# Patient Record
(demographics unavailable — no encounter records)

---

## 2018-06-11 NOTE — CT
CT HEAD WITHOUT CONTRAST:

 

INDICATIONS:

Dizziness.

 

COMPARISON:

08/18/2015

 

TECHNIQUE:

Multiple axial tomograms obtained through the head without IV enhancement.

 

FINDINGS:

The ventricles have normal size and position.  No evidence of intracranial mass, hemorrhage, or infar
ct identified.

 

IMPRESSION:

No acute abnormality identified.

 

POS: [facility]

## 2018-06-12 NOTE — HP
REASON FOR ADMISSION:  Dizziness.

 

HISTORY OF PRESENT ILLNESS:  The patient gives history of being in [facility] 
around 4:00 p.m. with her .  She was shopping.  She felt sudden onset of 
dizziness.  She almost fell, but held onto a table.  The  told her that 
she last ate around 10 in the morning and they finally went home and ate.  She 
was still feeling dizzy and finally made it to the emergency room.  No 
complaints of chest pain, palpitation, PND, or orthopnea.  No complaints of any 
specific weakness in any of the extremities.  No complaints of injury to the 
head or discharge from the ear.  No tinnitus.

 

PAST MEDICAL AND SURGICAL HISTORY:  Hypertension, not on any medication; 
history of asthma, which is well controlled; depression; bipolar disorder; back 
surgery x2; gastric bypass done in 2016; breast reduction surgery; 
hysterectomy.

 

CURRENT MEDICATIONS:  Patient takes Zoloft 10 mg daily, multivitamin 1 tab daily
, calcium with Vitamin D 1 tab daily.

 

ALLERGIES:  PENICILLIN.

 

PERSONAL HISTORY:  Does not abuse alcohol or drugs.  No history of smoking.

 

FAMILY HISTORY:  Mother is living and healthy.  Biological father  of lung 
cancer with mets in his 50s.

 

CODE STATUS:  FULL.  Power of  is her .

 

PRIMARY CARE PHYSICIAN:  Dr. Betty Serna.

 

REVIEW OF SYSTEMS:  The following complete review of systems was negative, 
unless otherwise mentioned in the HPI or below:  Constitutional:  Weight loss 
or gain, ability to conduct usual activities.  Skin:  Rash, itching.  Eyes:  
Double vision, pain.  ENT/Mouth:  Nose bleeding, neck stiffness, pain, 
tenderness.  Cardiovascular:  Palpitations, dyspnea on exertion, orthopnea.  
Respiratory:  Shortness of breath, wheezing, cough, hemoptysis, fever or night 
sweats.  Gastrointestinal:  Poor appetite, abdominal pain, heartburn, nausea, 
vomiting, constipation, or diarrhea.  Genitourinary:  Urgency, frequency, 
dysuria, nocturia.  Musculoskeletal:  Pain, swelling.  Neurologic/Psychiatric:  
Anxiety, depression.  Allergy/Immunologic:  Skin rash, bleeding tendency.

 

PHYSICAL EXAMINATION:

GENERAL:  The patient is a 47-year-old female who is currently not in any acute 
distress.

VITAL SIGNS:  Blood pressure 164/78, pulse 64 per minute, respiratory rate 18 
per minute, temperature 98.4 degrees Fahrenheit, saturating 98% on room air.

NECK:  Supple.  No elevated JVD.

HEENT: Eyes:  Extraocular muscles intact.  Pupils reacting to light.  Oral 
cavity mucous membranes are moist.  No exudates or congestion.

CARDIOVASCULAR:  S1, S2 heard.  Regular rhythm.

RESPIRATORY:  Air entry 2+ bilaterally.  No rales or rhonchi.

ABDOMEN:  Soft, bowel sounds heard.  No tenderness, rigidity, or guarding.

EXTREMITIES:  No peripheral edema or calf tenderness.

VASCULAR SYSTEM:  Peripheral pulses 2+ bilateral.  No ischemic ulcerations or 
gangrene.

CENTRAL NERVOUS SYSTEM:  No gross focal deficits seen.  Patient is alert, awake
, oriented well.

PSYCHIATRIC:  The patient's mood is euthymic.  No hallucinations or delusions.

 

LABORATORY AND X-RAY FINDINGS:  EKG done shows normal sinus rhythm at 66 beats 
per minute.  There is poor R-wave progression seen.  White count of 4, H and H 
13 and 36, platelet count 258,000 with 40% neutrophils and 56% lymphocytes.  
BUN 19, creatinine 1.0.  Serum glucose was 190.  Liver enzymes within normal 
limits.  First set of cardiac enzymes are negative.  UA shows moderate 
leukocyte esterase with 11-20 wbc's, but no bacteria seen.  CT brain done shows 
no acute infarct.

 

CLINICAL IMPRESSION AND PLAN:  The patient will be under observation on the 
stroke unit for severe abrupt onset of dizziness.  The patient also has 
hypertensive urgency and is not on any medications at home.  She will be given 
a dose of Procardia-XL and will be continued on 30 mg daily along with 
lisinopril with hydrochlorothiazide once daily.  We will obtain orthostatic 
blood pressures.  MRI without contrast to rule out posterior circulation 
stroke.  We will obtain a hemoglobin A1c level in view of elevated serum 
glucose.  Lipid profile will be obtained as well.  We will continue to closely 
monitor her on the stroke unit.

 

CARLI

## 2018-06-12 NOTE — PDOC.PN
- Subjective


Encounter Start Date: 06/12/18


Encounter Start Time: 17:00





Patient seen and examined for Dizziness. Still feels dizzy. No overnight events





- Objective


Resuscitation Status: 


 











Resuscitation Status           FULL:Full Resuscitation














MAR Reviewed: Yes


Vital Signs & Weight: 


 Vital Signs (12 hours)











  Temp Pulse Resp BP BP BP Pulse Ox


 


 06/12/18 15:03  97.3 F L  62  16    117/85  96


 


 06/12/18 11:18  98.6 F  72  16   147/100 H   97


 


 06/12/18 09:43   67   156/110 H   


 


 06/12/18 08:00  98.5 F  67  18    


 


 06/12/18 07:16  98.5 F  67  16    138/95 H  100








 Weight











Weight                         184 lb 11.2 oz














I&O: 


 











 06/11/18 06/12/18 06/13/18





 06:59 06:59 06:59


 


Intake Total  1425 960


 


Balance  1425 960











Result Diagrams: 


 06/12/18 04:31





 06/12/18 04:31


Additional Labs: 


 Accuchecks











  06/12/18 06/12/18 06/12/18





  16:32 11:30 05:37


 


POC Glucose  153 H  89  82














  06/12/18





  01:49


 


POC Glucose  140 H











Radiology Reviewed by me: Yes (MRI neg)


EKG Reviewed by me: Yes (Tele SR)





Phys Exam





- Physical Examination


Constitutional: NAD


Neck: no JVD, supple


Respiratory: no wheezing, no rales, no rhonchi, clear to auscultation bilateral


Cardiovascular: RRR, no rub


no heaves/pulsations


Gastrointestinal: soft, non-tender, no distention, positive bowel sounds


Musculoskeletal: no edema, pulses present


Neurological: non-focal, normal sensation, moves all 4 limbs


Psychiatric: normal affect, A&O x 3





Dx/Plan


(1) Dizziness


Code(s): R42 - DIZZINESS AND GIDDINESS   Status: Acute   





(2) Hypertensive urgency


Code(s): I16.0 - HYPERTENSIVE URGENCY   Status: Acute   





(3) UTI (urinary tract infection)


Status: Acute   





(4) Obesity (BMI 30.0-34.9)


Code(s): E66.9 - OBESITY, UNSPECIFIED   Status: Chronic   





(5) Depression


Code(s): F32.9 - MAJOR DEPRESSIVE DISORDER, SINGLE EPISODE, UNSPECIFIED   Status

: Chronic   





- Plan


DVT proph w/SCDs


Cont Lis-HCTZ, DC Procardia XL


-: Add Amlodipine if SBP >160


-: Cont Cipro for UTI - No cultures sent


-: Patient still feels dizzy and unsteady - Does not feel comfortable with dc


-: Cont current meds as below





Review of Systems





- Review of Systems


Respiratory: negative: Cough, Dry, Shortness of Breath, Hemoptysis, SOB with 

Excertion, Pleuritic Pain, Sputum, Wheezing


Cardiovascular: light headedness.  negative: chest pain, palpitations, orthopnea

, paroxysmal nocturnal dyspnea, edema, other


Gastrointestinal: negative: Nausea, Vomiting, Abdominal Pain, Diarrhea, 

Constipation, Melena, Hematochezia, Other





- Medications/Allergies


Allergies/Adverse Reactions: 


 Allergies











Allergy/AdvReac Type Severity Reaction Status Date / Time


 


Penicillins Allergy   Verified 10/20/14 08:24


 


propofol Allergy   Verified 10/20/14 11:08











Medications: 


 Current Medications





Acetaminophen (Tylenol)  650 mg PO Q4H PRN


   PRN Reason: Headache/Fever or Pain


   Last Admin: 06/12/18 15:59 Dose:  650 mg


Amlodipine Besylate (Norvasc)  5 mg PO DAILY PRN


   PRN Reason: SBP GREATER THAN 160


Ciprofloxacin (Cipro)  500 mg PO 0600,2000 [facility]


   Last Admin: 06/12/18 04:47 Dose:  500 mg


Clonidine (Catapres)  0.1 mg PO Q4H PRN


   PRN Reason: Systolic BP > 180


Dextrose/Water (Dextrose 50%)  25 gm SLOW IVP PRN PRN


   PRN Reason: Hypoglycemia


Famotidine (Pepcid)  20 mg PO BID [facility]


   Last Admin: 06/12/18 09:43 Dose:  20 mg


Glucagon (Glucagon)  1 mg IM PRN PRN


   PRN Reason: Hypoglycemia


Guaifenesin/Dextromethorphan (Robitussin Dm)  15 ml PO Q4H PRN


   PRN Reason: Cough


Lisinopril/HCTZ (Prinizide 10-12.5)  1 tab PO DAILY [facility]


   Last Admin: 06/12/18 09:43 Dose:  1 tab


Dextrose/Water (D5w)  1,000 mls @ 0 mls/hr IV .Q0M PRN; As Directed


   PRN Reason: Hypoglycemia


Insulin Human Lispro (Humalog)  0 units SC .MILD SLIDING SCALE PRN


   PRN Reason: Mild Correctional Scale


Meclizine HCl (Antivert)  25 mg PO Q8H PRN


   PRN Reason: Dizziness


   Last Admin: 06/12/18 15:59 Dose:  25 mg


Sodium Chloride (Flush - Normal Saline)  10 ml IVF Q12HR RAJ


Sodium Chloride (Flush - Normal Saline)  10 ml IVF PRN PRN


   PRN Reason: Saline Flush


   Last Admin: 06/12/18 10:23 Dose:  10 ml

## 2018-06-12 NOTE — MRI
BRAIN MRI WITHOUT CONTRAST:

 

06/12/2018

 

HISTORY:

Dizziness.  Transient ischemic attack.

 

COMPARISON:

None.

 

TECHNIQUE:

Multiplanar, multisequence MR imaging of the brain is provided without contrast.

 

FINDINGS:

The diffusion weighted imaging demonstrates no evidence for acute infarction.  The axial gradient ech
o imaging demonstrates no evidence for intracranial hemorrhage.

 

The imaged paranasal sinuses/mastoid air cells appear grossly unremarkable.  The arterial flow voids 
at the axial level of the skull base appear unremarkable on the T2 weighted imaging.  No midline shif
t, mass effect, or ventricular enlargement.  The orbits and globes demonstrate normal symmetric signa
l intensity.

 

Regional bone marrow signal intensity appears grossly unremarkable.

 

IMPRESSION:

No evidence for acute infarction or intracranial hemorrhage.

 

POS: [facility]

## 2018-06-13 NOTE — DIS
DATE OF ADMISSION:  06/12/2018

 

DATE OF DISCHARGE:  06/13/2018

 

ADMITTING DIAGNOSIS:  Severe dizziness.

 

DISCHARGE DIAGNOSIS:  Severe dizziness.

 

SECONDARY DIAGNOSES:

1.  Hypertensive urgency.

2.  History of depression.

3.  History of bipolar disorder.

4.  History of asthma.

 

HISTORY OF PRESENT ILLNESS AND HOSPITAL COURSE:  In brief, this is a 47-year-old African-American fem
vishnu with a known history of hypertension, poorly controlled.  She was at [facility] around 4:00 p.m. wit
h her  while shopping and she felt sudden onset of dizziness and she almost fell, but held ont
o the table.  The  told her that she last ate around 10 in the morning and they finally went h
ome and ate and she was still feeling dizzy and she came to the ER and she was noted to have a marked
ly elevated blood pressure of more than 200s.  Patient was initially started on lisinopril/hydrochlor
othiazide combination, which did not bring the blood pressures down.  She was closely monitored and w
as trying to rule out any evidence of intracranial tumor, so MRI of the brain was done to rule out an
y intracranial tumors.  This was unremarkable.  Her blood pressures remained high, so patient was sta
rted on amlodipine along with lisinopril and hydrochlorothiazide combination, which did help to bring
 the blood pressures down.  The patient's dizziness did disappear.  I discussed with the patient abou
t healthy habits of exercising and good sleep.  She did mention that she was not able to fall asleep,
 so advised her to continue on the trazodone, which did help her in the past.  The patient was stable
 and was discharged home in stable condition.

 

PHYSICAL EXAMINATION:

VITAL SIGNS:  Blood pressure is 110/72, heart rate of 64, respiratory rate 18, saturation 98%.

GENERAL:  The patient is moderately built and moderately nourished.  She does not appear to be in acu
te distress at this time.  Alert and oriented x3.

HEENT:  Atraumatic, normocephalic.

CARDIOVASCULAR:  S1 and S2 normal.  No murmurs, no rubs, no gallops.

LUNGS:  Bilateral air entry was equal.  No wheezing, no crackles.

ABDOMEN:  Soft, nontender, no guarding, no rebound tenderness.  Bowel sounds normal.

MUSCULOSKELETAL:  No calf tenderness.  No pedal edema.  No joint tenderness, no joint swelling.

 

DISCHARGE MEDICATIONS:

1.  Amlodipine 5 mg p.o. daily.

2.  Fluoxetine 10 mg p.o. daily.

3.  Lisinopril 1 tablet p.o. daily, 10/12.5 mg tablet, lisinopril/hydrochlorothiazide combination.

4.  Meclizine 25 mg p.o. q.8 hours.

5.  Tramadol 50 mg p.o. q.6 hours.

6.  Trazodone 100 mg p.o. at bedtime.

 

DISCHARGE INSTRUCTIONS:  Continue activity as tolerated.  Advised to follow up with a primary care ph
ysician in 2 days to recheck the blood pressures.  Continue exercise 30 minutes daily.  Advised to amanda bourgeois to follow up with good sleep habits.

 

I spent 35 minutes with this patient.